# Patient Record
Sex: FEMALE | Race: WHITE | NOT HISPANIC OR LATINO | Employment: UNEMPLOYED | URBAN - METROPOLITAN AREA
[De-identification: names, ages, dates, MRNs, and addresses within clinical notes are randomized per-mention and may not be internally consistent; named-entity substitution may affect disease eponyms.]

---

## 2021-11-18 ENCOUNTER — TRANSCRIBE ORDERS (OUTPATIENT)
Dept: ADMINISTRATIVE | Facility: HOSPITAL | Age: 26
End: 2021-11-18

## 2021-11-18 DIAGNOSIS — Z11.59 ENCOUNTER FOR SCREENING FOR VIRAL DISEASE: Primary | ICD-10-CM

## 2021-11-30 ENCOUNTER — LAB (OUTPATIENT)
Dept: PREADMISSION TESTING | Facility: HOSPITAL | Age: 26
End: 2021-11-30

## 2021-11-30 PROCEDURE — U0004 COV-19 TEST NON-CDC HGH THRU: HCPCS | Performed by: SURGERY

## 2021-11-30 PROCEDURE — C9803 HOPD COVID-19 SPEC COLLECT: HCPCS | Performed by: SURGERY

## 2021-12-01 LAB — SARS-COV-2 RNA PNL SPEC NAA+PROBE: NOT DETECTED

## 2021-12-03 ENCOUNTER — LAB REQUISITION (OUTPATIENT)
Dept: LAB | Facility: HOSPITAL | Age: 26
End: 2021-12-03

## 2021-12-03 DIAGNOSIS — N63.25 UNSPECIFIED LUMP IN THE LEFT BREAST, OVERLAPPING QUADRANTS: ICD-10-CM

## 2021-12-03 PROCEDURE — 88305 TISSUE EXAM BY PATHOLOGIST: CPT | Performed by: SURGERY

## 2021-12-06 LAB
CYTO UR: NORMAL
LAB AP CASE REPORT: NORMAL
LAB AP CLINICAL INFORMATION: NORMAL
PATH REPORT.FINAL DX SPEC: NORMAL
PATH REPORT.GROSS SPEC: NORMAL

## 2022-01-04 ENCOUNTER — APPOINTMENT (OUTPATIENT)
Dept: PREADMISSION TESTING | Facility: HOSPITAL | Age: 27
End: 2022-01-04

## 2023-03-29 ENCOUNTER — OFFICE VISIT (OUTPATIENT)
Dept: CARDIOLOGY | Facility: CLINIC | Age: 28
End: 2023-03-29
Payer: COMMERCIAL

## 2023-03-29 VITALS
HEART RATE: 72 BPM | OXYGEN SATURATION: 98 % | SYSTOLIC BLOOD PRESSURE: 110 MMHG | HEIGHT: 65 IN | WEIGHT: 165.6 LBS | BODY MASS INDEX: 27.59 KG/M2 | DIASTOLIC BLOOD PRESSURE: 72 MMHG

## 2023-03-29 DIAGNOSIS — R07.2 PRECORDIAL PAIN: ICD-10-CM

## 2023-03-29 DIAGNOSIS — R00.2 PALPITATIONS: Primary | ICD-10-CM

## 2023-03-29 DIAGNOSIS — R42 LIGHTHEADEDNESS: ICD-10-CM

## 2023-03-29 DIAGNOSIS — R55 SYNCOPE AND COLLAPSE: ICD-10-CM

## 2023-03-29 DIAGNOSIS — R53.82 CHRONIC FATIGUE: ICD-10-CM

## 2023-03-29 PROBLEM — G43.909 MIGRAINES: Status: ACTIVE | Noted: 2023-03-29

## 2023-03-29 PROBLEM — M35.9 CONNECTIVE TISSUE DISORDER: Status: ACTIVE | Noted: 2023-03-29

## 2023-03-29 PROBLEM — F32.A DEPRESSION: Status: ACTIVE | Noted: 2023-03-29

## 2023-03-29 PROBLEM — M06.9 RHEUMATOID ARTHRITIS: Status: ACTIVE | Noted: 2023-03-29

## 2023-03-29 PROBLEM — K21.9 GERD (GASTROESOPHAGEAL REFLUX DISEASE): Status: ACTIVE | Noted: 2023-03-29

## 2023-03-29 PROBLEM — I10 ESSENTIAL HYPERTENSION: Status: ACTIVE | Noted: 2023-03-29

## 2023-03-29 RX ORDER — PROPRANOLOL HCL 60 MG
1 CAPSULE, EXTENDED RELEASE 24HR ORAL DAILY
COMMUNITY
Start: 2023-03-24

## 2023-03-29 RX ORDER — SUMATRIPTAN 50 MG/1
TABLET, FILM COATED ORAL
COMMUNITY
Start: 2023-03-24

## 2023-03-29 RX ORDER — FERROUS SULFATE 325(65) MG
TABLET ORAL
COMMUNITY
Start: 2023-03-24

## 2023-03-29 RX ORDER — FEXOFENADINE HCL 180 MG/1
1 TABLET ORAL DAILY
COMMUNITY
Start: 2023-03-07

## 2023-03-29 RX ORDER — IBUPROFEN 800 MG/1
800 TABLET ORAL EVERY 6 HOURS PRN
COMMUNITY

## 2023-03-29 RX ORDER — ESTRADIOL 0.1 MG/G
CREAM VAGINAL
COMMUNITY
Start: 2023-03-24

## 2023-03-29 RX ORDER — FAMOTIDINE 40 MG/1
1 TABLET, FILM COATED ORAL DAILY
COMMUNITY
Start: 2023-03-24

## 2023-03-29 RX ORDER — DROSPIRENONE AND ESTETROL 3-14.2(28)
1 KIT ORAL DAILY
COMMUNITY
Start: 2023-03-07

## 2023-03-29 RX ORDER — CARIPRAZINE 1.5 MG-3MG
1 KIT ORAL DAILY
COMMUNITY
Start: 2023-03-01

## 2023-03-29 RX ORDER — TRETINOIN 0.5 MG/G
1 CREAM TOPICAL NIGHTLY PRN
COMMUNITY
Start: 2023-03-08

## 2023-03-29 RX ORDER — LINACLOTIDE 145 UG/1
1 CAPSULE, GELATIN COATED ORAL DAILY
COMMUNITY
Start: 2023-03-24

## 2023-03-29 NOTE — PROGRESS NOTES
[facility] Cardiology  [address], [address]  [place], [place], [address] [phone]  WWW.[facility]           OUTPATIENT CLINIC CONSULTATION NOTE    Patient care team:  Patient Care Team:  Chente Major PA as PCP - General (Family Medicine)  Diego Judd MD as Consulting Physician (Cardiology)    Requesting Provider and Reason for consultation: The patient is being seen today at the request of Chente Major PA-C for palpitations.     Subjective:   Chief complaint:   Chief Complaint   Patient presents with   • Palpitations   • Chest Pain   • Shortness of Breath   • Dizziness       HPI:    Iram Godfrey is a 27 y.o. female.  Cardiac focused problem list:  1. Palpitations  2. Lightheadedness  3. Exercise intolerance  4. Hypertension  5. GERD  6. Rheumatoid arthritis  7. Chronic fatigue  8. Depression  9. Migraines    Patient presents today for consultation for palpitations.  Patient reports she has had palpitations for at least 10 years.  In the last year she has had worsening episodes of chest pain, shortness of breath, dizziness and fatigue.  This has been particularly worse in the last 5 to 6 months.  Symptoms associated with palpitations.  Episodes occurring 2-3 times a week.  Initially were more common after eating a large meal but since starting on propranolol 2 months ago they now seem more random.  Slight improvement in chest pain and headaches since starting propranolol.    Has family history of coronary disease in her mother who has had multiple MIs and is also a patient of Dr. Judd.  She notes history of heart disease on her father side of the family as well.  Patient denies tobacco, EtOH or drug use.  Has not been able to exercise recently due to increased fatigue and shortness of breath.  She has not had any prior cardiac testing.    Review of Systems:  As noted above in the HPI    PFSH:  Patient Active Problem List   Diagnosis   • Palpitations   •  Essential hypertension   • GERD (gastroesophageal reflux disease)   • Connective tissue disorder (HCC)   • Chronic fatigue   • Depression   • Migraines         Current Outpatient Medications:   •  Cariprazine HCl (Vraylar) 1.5 & 3 MG capsule therapy, Take 1 tablet by mouth Daily., Disp: , Rfl:   •  estradiol (ESTRACE) 0.1 MG/GM vaginal cream, INSERT 1 GRAM VAGINALLY AT BEDTIME TWICE WEEKLY & APPLY SPARINGLY EXTERNALLY AS DIRECTED, Disp: , Rfl:   •  famotidine (PEPCID) 40 MG tablet, Take 1 tablet by mouth Daily., Disp: , Rfl:   •  FeroSul 325 (65 Fe) MG tablet, TAKE ONE TABLET BY MOUTH DAILY WITH A MEAL, Disp: , Rfl:   •  fexofenadine (ALLEGRA) 180 MG tablet, Take 1 tablet by mouth Daily., Disp: , Rfl:   •  ibuprofen (ADVIL,MOTRIN) 800 MG tablet, Take 1 tablet by mouth Every 6 (Six) Hours As Needed for Mild Pain., Disp: , Rfl:   •  Linzess 145 MCG capsule capsule, Take 1 capsule by mouth Daily., Disp: , Rfl:   •  Nextstellis 3-14.2 MG tablet, Take 1 tablet by mouth Daily., Disp: , Rfl:   •  propranolol LA (INDERAL LA) 60 MG 24 hr capsule, Take 1 capsule by mouth Daily., Disp: , Rfl:   •  SUMAtriptan (IMITREX) 50 MG tablet, TAKE ONE TABLET BY MOUTH AT ONSET OF MIGRAINE, MAY REPEAT AFTER 2 HOURS IF HEADACHE RETURNS, MAX 4 TABLETS IN 24 HOURS, Disp: , Rfl:   •  tretinoin (RETIN-A) 0.05 % cream, Apply 1 application topically to the appropriate area as directed At Night As Needed., Disp: , Rfl:   •  vitamin D3 125 MCG (5000 UT) capsule capsule, Take 1 capsule by mouth Daily., Disp: , Rfl:     Allergies   Allergen Reactions   • Doxycycline GI Intolerance   • Trazodone Swelling     Face, eyes and tongue       Social History     Socioeconomic History   • Marital status:    Tobacco Use   • Smoking status: Never   • Smokeless tobacco: Never   Vaping Use   • Vaping Use: Never used   Substance and Sexual Activity   • Alcohol use: Never   • Drug use: Never   • Sexual activity: Defer     Family History   Problem Relation  "Age of Onset   • Heart attack Mother    • Diabetes Mother         Hx of diabetes   • Diabetes Father    • Hypertension Father    • Lupus Sister          Objective:   Physical Exam:  /72 (BP Location: Right arm, Patient Position: Sitting)   Pulse 72   Ht 165.1 cm (65\")   Wt 75.1 kg (165 lb 9.6 oz)   SpO2 98%   BMI 27.56 kg/m²   CONSTITUTIONAL: No acute distress  RESPIRATORY: Normal effort. Clear to auscultation bilaterally without wheezing or rales  CARDIOVASCULAR: Regular rate and rhythm with normal S1 and S2. Without murmur.  PERIPHERAL VASCULAR: No carotid bruit bilaterally.  Normal radial pulse. There is no lower extremity edema bilaterally.    3/2023 exam: Laying 112/70 HR 72, sitting 114/80 HR 72, standing 108/80 HR 72    Labs:  Labs reviewed by myself    No results found for: CHOL  No results found for: TRIG  No results found for: HDL  No results found for: LDL  No components found for: LDLDIRECTC    [facility] labs, 1/18/2023:  · Thyroid panel: TSH 4.11  · CBC: WBC 7.2, Hgb 13.5, HCT 40,       Diagnostic Data:      ECG 12 Lead    Date/Time: 3/29/2023 2:10 PM  Performed by: Diego Judd MD  Authorized by: Diego Judd MD   Comparison: not compared with previous ECG   Rhythm: sinus rhythm and sinus arrhythmia  Rate: normal  BPM: 66  Conduction: conduction normal                  Assessment and Plan:     Palpitations  Precordial pain  Lightheadedness   Chronic fatigue  Presyncope  -Longstanding history of palpitations for the last 10 years.  Now with multitude of symptoms including palpitations, chest pain, lightheadedness, fatigue, pre-syncope worsening over the last year.   -Possibly has autonomic dysfunction  -Orthostatic BP/HR measurements negative for orthostasis and POTS in office 3/2023  -Recommend heart monitor to evaluate for arrhythmias.  48 hour monitor due to insurance coverage.  14-day heart monitor if 48-hour monitor does not capture an episode of the patient's significant " palpitations  -Echocardiogram to rule out structural or functional abnormalities.  -Tilt table test to further evaluate autonomic dysfunction  -Continue propranolol.  It seems to have helped  -Encouraged increased exercise, recommended the Maurer/CHOPS protocol  -Encouraged hydration and liberalizing salt  -We will consider salt tablets, Florinef, etc  -Began discussion of being seen at an autonomic dysfunction center.    - Return in about 6 months (around 9/29/2023) for Next scheduled follow up.    Scribed for Diego Judd MD by Brianna Ya, APRN. 3/29/2023  14:10 EDT    Diego Judd MD, MSc, FACC, [facility]  Interventional Cardiology  [facility]

## 2023-04-03 ENCOUNTER — HOSPITAL ENCOUNTER (OUTPATIENT)
Dept: CARDIOLOGY | Facility: HOSPITAL | Age: 28
Discharge: HOME | End: 2023-04-03
Admitting: INTERNAL MEDICINE
Payer: COMMERCIAL

## 2023-04-03 VITALS
HEIGHT: 65 IN | BODY MASS INDEX: 27.49 KG/M2 | SYSTOLIC BLOOD PRESSURE: 119 MMHG | DIASTOLIC BLOOD PRESSURE: 73 MMHG | WEIGHT: 165 LBS

## 2023-04-03 DIAGNOSIS — R55 SYNCOPE AND COLLAPSE: ICD-10-CM

## 2023-04-03 LAB
BH CV ECHO MEAS - AO MAX PG: 6 MMHG
BH CV ECHO MEAS - AO MEAN PG: 3.7 MMHG
BH CV ECHO MEAS - AO ROOT DIAM: 2.7 CM
BH CV ECHO MEAS - AO V2 MAX: 122.1 CM/SEC
BH CV ECHO MEAS - AO V2 VTI: 27.5 CM
BH CV ECHO MEAS - AVA(I,D): 2.11 CM2
BH CV ECHO MEAS - EDV(CUBED): 69.9 ML
BH CV ECHO MEAS - EDV(MOD-SP2): 66 ML
BH CV ECHO MEAS - EDV(MOD-SP4): 73 ML
BH CV ECHO MEAS - EF(MOD-BP): 59 %
BH CV ECHO MEAS - EF(MOD-SP2): 60.6 %
BH CV ECHO MEAS - EF(MOD-SP4): 60.3 %
BH CV ECHO MEAS - ESV(CUBED): 17 ML
BH CV ECHO MEAS - ESV(MOD-SP2): 26 ML
BH CV ECHO MEAS - ESV(MOD-SP4): 29 ML
BH CV ECHO MEAS - FS: 37.6 %
BH CV ECHO MEAS - IVS/LVPW: 1.08 CM
BH CV ECHO MEAS - IVSD: 0.93 CM
BH CV ECHO MEAS - LA DIMENSION: 2.8 CM
BH CV ECHO MEAS - LV DIASTOLIC VOL/BSA (35-75): 40 CM2
BH CV ECHO MEAS - LV MASS(C)D: 114 GRAMS
BH CV ECHO MEAS - LV MAX PG: 3.3 MMHG
BH CV ECHO MEAS - LV MEAN PG: 1.91 MMHG
BH CV ECHO MEAS - LV SYSTOLIC VOL/BSA (12-30): 15.9 CM2
BH CV ECHO MEAS - LV V1 MAX: 91.3 CM/SEC
BH CV ECHO MEAS - LV V1 VTI: 19 CM
BH CV ECHO MEAS - LVIDD: 4.1 CM
BH CV ECHO MEAS - LVIDS: 2.6 CM
BH CV ECHO MEAS - LVOT AREA: 3.1 CM2
BH CV ECHO MEAS - LVOT DIAM: 1.97 CM
BH CV ECHO MEAS - LVPWD: 0.86 CM
BH CV ECHO MEAS - MR MAX PG: 35.7 MMHG
BH CV ECHO MEAS - MR MAX VEL: 291.5 CM/SEC
BH CV ECHO MEAS - MV A MAX VEL: 46.3 CM/SEC
BH CV ECHO MEAS - MV DEC SLOPE: 214 CM/SEC2
BH CV ECHO MEAS - MV DEC TIME: 0.18 MSEC
BH CV ECHO MEAS - MV E MAX VEL: 86 CM/SEC
BH CV ECHO MEAS - MV E/A: 1.86
BH CV ECHO MEAS - MV MAX PG: 3.9 MMHG
BH CV ECHO MEAS - MV MEAN PG: 1.29 MMHG
BH CV ECHO MEAS - MV P1/2T: 137.7 MSEC
BH CV ECHO MEAS - MV V2 VTI: 33.4 CM
BH CV ECHO MEAS - MVA(P1/2T): 1.6 CM2
BH CV ECHO MEAS - MVA(VTI): 1.74 CM2
BH CV ECHO MEAS - PA ACC SLOPE: 461 CM/SEC2
BH CV ECHO MEAS - PA ACC TIME: 0.16 SEC
BH CV ECHO MEAS - PA PR(ACCEL): 7.7 MMHG
BH CV ECHO MEAS - PA V2 MAX: 109.9 CM/SEC
BH CV ECHO MEAS - PI END-D VEL: 105.9 CM/SEC
BH CV ECHO MEAS - PULM A REVS VEL: 19.7 CM/SEC
BH CV ECHO MEAS - PULM DIAS VEL: 31.6 CM/SEC
BH CV ECHO MEAS - PULM S/D: 1.55
BH CV ECHO MEAS - PULM SYS VEL: 49 CM/SEC
BH CV ECHO MEAS - RAP SYSTOLE: 3 MMHG
BH CV ECHO MEAS - RVSP: 15 MMHG
BH CV ECHO MEAS - SI(MOD-SP2): 21.9 ML/M2
BH CV ECHO MEAS - SI(MOD-SP4): 24.1 ML/M2
BH CV ECHO MEAS - SV(LVOT): 58.1 ML
BH CV ECHO MEAS - SV(MOD-SP2): 40 ML
BH CV ECHO MEAS - SV(MOD-SP4): 44 ML
BH CV ECHO MEAS - TAPSE (>1.6): 2 CM
BH CV ECHO MEAS - TR MAX PG: 12 MMHG
BH CV ECHO MEAS - TR MAX VEL: 169.7 CM/SEC
BH CV XLRA - RV BASE: 3.7 CM
BH CV XLRA - RV LENGTH: 6.5 CM
BH CV XLRA - RV MID: 2.7 CM
LEFT ATRIUM VOLUME INDEX: 17 ML/M2
MAXIMAL PREDICTED HEART RATE: 191 BPM
STRESS TARGET HR: 162 BPM

## 2023-04-03 PROCEDURE — 93306 TTE W/DOPPLER COMPLETE: CPT | Performed by: INTERNAL MEDICINE

## 2023-04-03 PROCEDURE — 93306 TTE W/DOPPLER COMPLETE: CPT

## 2023-04-18 ENCOUNTER — TELEPHONE (OUTPATIENT)
Dept: CARDIOLOGY | Facility: CLINIC | Age: 28
End: 2023-04-18
Payer: COMMERCIAL

## 2023-04-18 DIAGNOSIS — R00.2 PALPITATIONS: Primary | ICD-10-CM

## 2023-04-18 NOTE — TELEPHONE ENCOUNTER
----- Message from Diego Judd MD sent at 4/17/2023  5:27 PM EDT -----  48-hour Holter did not show any arrhythmia.  Recommend 14-day monitor  ----- Message -----  From: Diego Judd MD  Sent: 4/17/2023   2:42 PM EDT  To: Diego Judd MD

## 2023-04-26 ENCOUNTER — OFFICE VISIT (OUTPATIENT)
Dept: CARDIOLOGY | Facility: CLINIC | Age: 28
End: 2023-04-26
Payer: COMMERCIAL

## 2023-04-26 VITALS
HEART RATE: 83 BPM | HEIGHT: 65 IN | WEIGHT: 174 LBS | DIASTOLIC BLOOD PRESSURE: 72 MMHG | OXYGEN SATURATION: 97 % | SYSTOLIC BLOOD PRESSURE: 122 MMHG | BODY MASS INDEX: 28.99 KG/M2

## 2023-04-26 DIAGNOSIS — G47.19 OTHER HYPERSOMNIA: Primary | ICD-10-CM

## 2023-04-26 RX ORDER — LAMOTRIGINE 100 MG/1
TABLET ORAL
COMMUNITY
Start: 2023-04-19

## 2023-04-26 RX ORDER — HYDROXYCHLOROQUINE SULFATE 200 MG/1
200 TABLET, FILM COATED ORAL 2 TIMES DAILY
COMMUNITY
Start: 2023-04-12

## 2023-04-26 RX ORDER — PRAZOSIN HYDROCHLORIDE 1 MG/1
1 CAPSULE ORAL
COMMUNITY
Start: 2023-04-19

## 2023-04-26 RX ORDER — TRIAMCINOLONE ACETONIDE 1 MG/G
CREAM TOPICAL
COMMUNITY
Start: 2023-03-08

## 2023-04-26 RX ORDER — FEXOFENADINE HCL 180 MG/1
1 TABLET ORAL DAILY
COMMUNITY

## 2023-04-26 RX ORDER — FAMOTIDINE 40 MG/1
40 TABLET, FILM COATED ORAL DAILY
COMMUNITY
Start: 2023-03-24

## 2023-04-26 RX ORDER — ESTRADIOL 0.1 MG/G
CREAM VAGINAL
COMMUNITY
Start: 2023-03-24

## 2023-04-26 RX ORDER — SUMATRIPTAN 50 MG/1
1 TABLET, FILM COATED ORAL AS NEEDED
COMMUNITY
Start: 2023-03-24

## 2023-04-26 RX ORDER — IBUPROFEN 800 MG/1
800 TABLET ORAL AS NEEDED
COMMUNITY
Start: 2023-04-11

## 2023-04-26 RX ORDER — CHOLECALCIFEROL (VITAMIN D3) 1250 MCG
1 CAPSULE ORAL DAILY
COMMUNITY
Start: 2023-02-01

## 2023-04-26 NOTE — PROGRESS NOTES
New Sleep Consult     Date:   2023  Name: rIam Godfrey  :   1995  PCP: Chente Major PA    Chief Complaint   Patient presents with   • [facility]       Subjective     History of Present Illness  Iram Godfrey is a 27 y.o. female who presents today for new patient evaluation for possible sleep apnea.  She has had a long-term issue with her sleep.  She has nightmares recently when she tries to fall asleep.  She also can only sleep a few hours the night before she wakes up.  Her nightmares have gotten worse recently since starting Lamictal.  She also has daytime sleepiness excessively.  She has had some sleepy driving but has never fallen asleep at the wheel.  She does snore mildly but does not wake up gasping.    No specialty comments available.    Further details are as follows:    Custer Scale is (out of 24): Total score: 20     Estimated average amount of sleep per night: 5  Number of times she wakes up at night: 2  Difficulty falling back asleep: yes  It usually takes 60 minutes to go to sleep.  She feels sleepy upon waking up: yes  Rotating or night shift work: no    Drowsiness/Sleepiness:  She exhibits the following:  excessive daytime sleepiness  excessive daytime fatigue  falls asleep watching TV  difficulty driving due to sleepiness  sleepy even while on vacation    Snoring/Breathing:  She exhibits the following:  loud snoring and awakens with nightmares    Head Injury:  She exhibits the following:  No    Reflux:  She describes the following:  none    Narcolepsy:  She exhibits the following:  feeling of paralysis while going to sleep or coming out of sleep  visual hallucinations while falling asleep    RLS/PLMs:  She describes the following:  moves or jerks during sleep  discomfort in legs with an urge to move them    Insomnia:  She describes the following:  problems initiating sleep at night  frequent awakenings  restless sleep    Parasomnia:  She exhibits the  following:  sleep talks  acts out dreams  wakes up screaming at night  frequent nightmares  grinds teeth    Weight:       04/26/23  1113   Weight: 78.9 kg (174 lb)      Weight change in the last year:  gain: 15 lbs    The patient's relevant past medical, surgical, family, and social history reviewed and updated in Epic as appropriate.    Past Medical History:   Diagnosis Date   • Hypertension    • IBS (irritable bowel syndrome)    • Migraines    • Mixed connective tissue disease      Past Surgical History:   Procedure Laterality Date   • BREAST BIOPSY Left     fibro adenoma   • ENDOMETRIAL BIOPSY      lapscopric   • TONSILLECTOMY     • WISDOM TOOTH EXTRACTION       OB History    No obstetric history on file.       Allergies   Allergen Reactions   • Doxycycline GI Intolerance   • Trazodone Swelling     Face, eyes and tongue     Prior to Admission medications    Medication Sig Start Date End Date Taking? Authorizing Provider   Cholecalciferol (Vitamin D3) 1.25 MG (16194 UT) capsule Take 1 capsule by mouth Daily. 2/1/23  Yes Marcella Riley MD   diclofenac (VOLTAREN) 50 MG EC tablet Take 1 tablet by mouth Daily As Needed. 4/21/23  Yes Marcella Riley MD   estradiol (ESTRACE) 0.1 MG/GM vaginal cream Insert  into the vagina. 3/24/23  Yes Marcella Riley MD   famotidine (PEPCID) 40 MG tablet Take 1 tablet by mouth Daily. 3/24/23  Yes Marcella Riley MD   FeroSul 325 (65 Fe) MG tablet TAKE ONE TABLET BY MOUTH DAILY WITH A MEAL 3/24/23  Yes Marcella Riley MD   fexofenadine (ALLEGRA) 180 MG tablet Take 1 tablet by mouth Daily.   Yes ProviderMarcella MD   hydroxychloroquine (PLAQUENIL) 200 MG tablet Take 1 tablet by mouth 2 (Two) Times a Day. 4/12/23  Yes Marcella Riley MD   ibuprofen (ADVIL,MOTRIN) 800 MG tablet Take 1 tablet by mouth As Needed. 4/11/23  Yes Marcella Riley MD   lamoTRIgine (LaMICtal) 100 MG tablet TAKE 12 TABLET BY MOUTH DAILY FOR 7 DAYS THEN INCREASE TO 1  DAILY 4/19/23  Yes Marcella Riley MD   Linzess 145 MCG capsule capsule Take 1 capsule by mouth Daily. 3/24/23  Yes Marcella Riley MD   Nextellis 3-14.2 MG tablet Take 1 tablet by mouth Daily. 3/7/23  Yes Marcella Riley MD   prazosin (MINIPRESS) 1 MG capsule Take 1 capsule by mouth Tonight. 4/19/23  Yes Marcella Riley MD   propranolol LA (INDERAL LA) 60 MG 24 hr capsule Take 1 capsule by mouth Daily. 3/24/23  Yes Marcella Riley MD   SUMAtriptan (IMITREX) 50 MG tablet Take 1 tablet by mouth As Needed. 3/24/23  Yes Marcella Riley MD   triamcinolone (KENALOG) 0.1 % cream  3/8/23  Yes Marcella Riley MD   Cariprazine HCl (Vraylar) 1.5 & 3 MG capsule therapy Take 1 tablet by mouth Daily. 3/1/23 4/26/23  Marcella Riley MD   estradiol (ESTRACE) 0.1 MG/GM vaginal cream INSERT 1 GRAM VAGINALLY AT BEDTIME TWICE WEEKLY & APPLY SPARINGLY EXTERNALLY AS DIRECTED 3/24/23 4/26/23  Marcella Riley MD   famotidine (PEPCID) 40 MG tablet Take 1 tablet by mouth Daily. 3/24/23 4/26/23  Marcella Riley MD   fexofenadine (ALLEGRA) 180 MG tablet Take 1 tablet by mouth Daily. 3/7/23 4/26/23  Marcella Riley MD   ibuprofen (ADVIL,MOTRIN) 800 MG tablet Take 1 tablet by mouth Every 6 (Six) Hours As Needed for Mild Pain.  4/26/23  Marcella Riley MD   SUMAtriptan (IMITREX) 50 MG tablet TAKE ONE TABLET BY MOUTH AT ONSET OF MIGRAINE, MAY REPEAT AFTER 2 HOURS IF HEADACHE RETURNS, MAX 4 TABLETS IN 24 HOURS 3/24/23 4/26/23  Marcella Riley MD   tretinoin (RETIN-A) 0.05 % cream Apply 1 application topically to the appropriate area as directed At Night As Needed. 3/8/23 4/26/23  Marcella Riley MD   vitamin D3 125 MCG (5000 UT) capsule capsule Take 1 capsule by mouth Daily.  4/26/23  Marcella Riley MD     Family History   Problem Relation Age of Onset   • Heart attack Mother    • Diabetes Mother         Hx of diabetes   • Diabetes Father    • Hypertension  "Father    • Lupus Sister        Objective     Vital Signs:  /72 (BP Location: Left arm, Patient Position: Sitting)   Pulse 83   Ht 165.1 cm (65\")   Wt 78.9 kg (174 lb)   SpO2 97%   BMI 28.96 kg/m²     BMI is >= 25 and <30. (Overweight) The following options were offered after discussion;: referral to a nutritionist and referral to primary care        Physical Exam  Vitals reviewed.   Constitutional:       General: She is not in acute distress.     Appearance: Normal appearance.   HENT:      Head: Normocephalic and atraumatic.      Mouth/Throat:      Mouth: Mucous membranes are moist.   Eyes:      Conjunctiva/sclera: Conjunctivae normal.   Neck:      Vascular: No carotid bruit.   Cardiovascular:      Rate and Rhythm: Normal rate and regular rhythm.      Pulses: Normal pulses.      Heart sounds: Normal heart sounds. No murmur heard.  Pulmonary:      Effort: Pulmonary effort is normal. No respiratory distress.      Breath sounds: Normal breath sounds. No wheezing or rhonchi.   Abdominal:      General: Abdomen is flat.      Palpations: Abdomen is soft.   Musculoskeletal:      Cervical back: Normal range of motion and neck supple.      Right lower leg: No edema.      Left lower leg: No edema.   Skin:     General: Skin is warm and dry.      Coloration: Skin is not jaundiced.   Neurological:      General: No focal deficit present.      Mental Status: She is alert and oriented to person, place, and time. Mental status is at baseline.      GCS: GCS eye subscore is 4. GCS verbal subscore is 5. GCS motor subscore is 6.      Cranial Nerves: No cranial nerve deficit.      Motor: No weakness.      Gait: Gait normal.   Psychiatric:         Mood and Affect: Mood and affect normal. Mood is not anxious.         Speech: Speech normal.         Behavior: Behavior normal.                   Assessment and Plan     Iram Godfrey is a 27 y.o. female who presents for further evaluation of excessive daytime sleepiness and " fatigue, nonrestorative sleep, and concerns for sleep disordered breathing and obstructive sleep apnea.  We will obtain a home sleep test for further evaluation.  The patient will return for follow-up and recommendations after test.  I have discussed weight loss as it pertains to obstructive sleep apnea.    Diagnoses and all orders for this visit:    1. Other hypersomnia (Primary)  -     Home Sleep Study; Future    I discussed that she may have more complex sleep disorders that may require referral for cognitive behavioral therapy.  Also discussed that we may need an MSLT in the future if narcolepsy is suspected.  May need in lab sleep study as well.  She is agreeable with all of these things.  We will start with home sleep study first and go from there.    I discussed the consequences of uncontrolled sleep apnea including hypertension, heart disease, diabetes, stroke, and dementia. I further discussed sleep apnea therapeutic options including CPAP, Weight loss, Oral dental appliance, and surgery.    ER if symptoms increase, Sleep hygiene discussed, Sleep risks reviewed (driving, medical, sleep death, sedating agents), Exercise recommendations discussed and Report if any new/changing symptoms immediately         Follow Up  Return in about 3 months (around 7/26/2023) for Recheck symptoms.  Patient was given instructions and counseling regarding her condition or for health maintenance advice. Please see specific information pulled into the AVS if appropriate.

## 2023-05-12 ENCOUNTER — HOSPITAL ENCOUNTER (OUTPATIENT)
Dept: CARDIOLOGY | Facility: HOSPITAL | Age: 28
Discharge: HOME | End: 2023-05-12
Admitting: INTERNAL MEDICINE
Payer: COMMERCIAL

## 2023-05-12 DIAGNOSIS — R55 SYNCOPE AND COLLAPSE: ICD-10-CM

## 2023-05-12 PROCEDURE — 93660 TILT TABLE EVALUATION: CPT

## 2023-05-12 PROCEDURE — 93660 TILT TABLE EVALUATION: CPT | Performed by: INTERNAL MEDICINE

## 2023-05-15 ENCOUNTER — TELEPHONE (OUTPATIENT)
Dept: CARDIOLOGY | Facility: CLINIC | Age: 28
End: 2023-05-15
Payer: COMMERCIAL

## 2023-05-15 LAB
MAXIMAL PREDICTED HEART RATE: 191 BPM
STRESS TARGET HR: 162 BPM

## 2023-05-15 NOTE — TELEPHONE ENCOUNTER
----- Message from Diego Judd MD sent at 5/15/2023 10:03 AM EDT -----  Please let patient know tilt table test and heart monitor both look fine.  Recommend aggressive exercise, fluid hydration and continue propranolol    ----- Message -----  From: Honorio Fleming MD  Sent: 5/15/2023   9:50 AM EDT  To: Diego Judd MD

## 2023-06-05 DIAGNOSIS — G47.19 OTHER HYPERSOMNIA: ICD-10-CM

## 2023-06-07 ENCOUNTER — OFFICE VISIT (OUTPATIENT)
Dept: CARDIOLOGY | Facility: CLINIC | Age: 28
End: 2023-06-07
Payer: COMMERCIAL

## 2023-06-07 VITALS
WEIGHT: 174 LBS | SYSTOLIC BLOOD PRESSURE: 116 MMHG | HEIGHT: 65 IN | DIASTOLIC BLOOD PRESSURE: 70 MMHG | OXYGEN SATURATION: 98 % | BODY MASS INDEX: 28.99 KG/M2 | HEART RATE: 78 BPM

## 2023-06-07 DIAGNOSIS — G47.10 HYPERSOMNIA: Primary | ICD-10-CM

## 2023-06-07 PROCEDURE — 99213 OFFICE O/P EST LOW 20 MIN: CPT | Performed by: NURSE PRACTITIONER

## 2023-06-07 PROCEDURE — 3074F SYST BP LT 130 MM HG: CPT | Performed by: NURSE PRACTITIONER

## 2023-06-07 PROCEDURE — 1159F MED LIST DOCD IN RCRD: CPT | Performed by: NURSE PRACTITIONER

## 2023-06-07 PROCEDURE — 1160F RVW MEDS BY RX/DR IN RCRD: CPT | Performed by: NURSE PRACTITIONER

## 2023-06-07 PROCEDURE — 3078F DIAST BP <80 MM HG: CPT | Performed by: NURSE PRACTITIONER

## 2023-06-07 RX ORDER — TRETINOIN 0.5 MG/G
CREAM TOPICAL
COMMUNITY
Start: 2023-05-30

## 2023-06-07 NOTE — PROGRESS NOTES
Cardiovascular and Sleep Consulting Provider Note     Date:   2023   Name: Iram Godfrey  :   1995  PCP: Chente Major PA    Chief Complaint   Patient presents with    hypersomnia       Subjective     History of Present Illness  Iram Godfrey is a 27 y.o. female who presents today for home sleep study results.  At last office visit on 2023, patient reported long-term issues with sleep.  She does not sleep well at night and experiences excessive daytime sleepiness.  She falls asleep several times a day.  She sometimes gets sleepy when she is driving.  She will sometimes fall asleep while riding on the back of her 's motorcycle.  Her primary care provider is concerned she may have narcolepsy.  Sleep study from 2023 revealed an overall AHI of 1.5, sleep apnea not seen.  I discussed further testing to rule out narcolepsy with patient.  She would like to proceed with MSLT.    No specialty comments available.    Allergies   Allergen Reactions    Doxycycline GI Intolerance    Trazodone Swelling     Face, eyes and tongue       Current Outpatient Medications:     Cholecalciferol (Vitamin D3) 1.25 MG (93890 UT) capsule, Take 1 capsule by mouth Daily., Disp: , Rfl:     diclofenac (VOLTAREN) 50 MG EC tablet, Take 1 tablet by mouth Daily As Needed., Disp: , Rfl:     estradiol (ESTRACE) 0.1 MG/GM vaginal cream, Insert  into the vagina., Disp: , Rfl:     famotidine (PEPCID) 40 MG tablet, Take 1 tablet by mouth Daily., Disp: , Rfl:     FeroSul 325 (65 Fe) MG tablet, TAKE ONE TABLET BY MOUTH DAILY WITH A MEAL, Disp: , Rfl:     fexofenadine (ALLEGRA) 180 MG tablet, Take 1 tablet by mouth Daily., Disp: , Rfl:     hydroxychloroquine (PLAQUENIL) 200 MG tablet, Take 1 tablet by mouth 2 (Two) Times a Day., Disp: , Rfl:     ibuprofen (ADVIL,MOTRIN) 800 MG tablet, Take 1 tablet by mouth As Needed., Disp: , Rfl:     lamoTRIgine (LaMICtal) 100 MG tablet, TAKE 12 TABLET BY MOUTH DAILY FOR 7  "DAYS THEN INCREASE TO 1 DAILY, Disp: , Rfl:     Linzess 145 MCG capsule capsule, Take 1 capsule by mouth Daily., Disp: , Rfl:     Nextstellis 3-14.2 MG tablet, Take 1 tablet by mouth Daily., Disp: , Rfl:     prazosin (MINIPRESS) 1 MG capsule, Take 1 capsule by mouth Tonight., Disp: , Rfl:     propranolol LA (INDERAL LA) 60 MG 24 hr capsule, Take 1 capsule by mouth Daily., Disp: , Rfl:     SUMAtriptan (IMITREX) 50 MG tablet, Take 1 tablet by mouth As Needed., Disp: , Rfl:     tretinoin (RETIN-A) 0.05 % cream, , Disp: , Rfl:     triamcinolone (KENALOG) 0.1 % cream, , Disp: , Rfl:     Past Medical History:   Diagnosis Date    Hypertension     IBS (irritable bowel syndrome)     Migraines     Mixed connective tissue disease       Past Surgical History:   Procedure Laterality Date    BREAST BIOPSY Left     fibro adenoma    ENDOMETRIAL BIOPSY      lapscopric    TONSILLECTOMY      WISDOM TOOTH EXTRACTION       Family History   Problem Relation Age of Onset    Heart attack Mother     Diabetes Mother         Hx of diabetes    Diabetes Father     Hypertension Father     Lupus Sister      Social History     Socioeconomic History    Marital status:    Tobacco Use    Smoking status: Never    Smokeless tobacco: Never   Vaping Use    Vaping Use: Never used   Substance and Sexual Activity    Alcohol use: Never    Drug use: Never    Sexual activity: Defer       Objective     Vital Signs:  /70   Pulse 78   Ht 165.1 cm (65\")   Wt 78.9 kg (174 lb)   SpO2 98%   BMI 28.96 kg/m²   Estimated body mass index is 28.96 kg/m² as calculated from the following:    Height as of this encounter: 165.1 cm (65\").    Weight as of this encounter: 78.9 kg (174 lb).       [unfilled]    Physical Exam  Vitals reviewed.   Constitutional:       Appearance: Normal appearance.   HENT:      Head: Normocephalic.   Cardiovascular:      Rate and Rhythm: Normal rate and regular rhythm.      Heart sounds: Normal heart sounds.   Pulmonary: "      Effort: Pulmonary effort is normal.      Breath sounds: Normal breath sounds.   Musculoskeletal:      Right lower leg: No edema.      Left lower leg: No edema.   Skin:     General: Skin is warm and dry.      Capillary Refill: Capillary refill takes less than 2 seconds.   Neurological:      General: No focal deficit present.      Mental Status: She is alert and oriented to person, place, and time.   Psychiatric:         Mood and Affect: Mood normal.         Behavior: Behavior normal.         Home sleep study reviewed          Assessment and Plan     Diagnoses and all orders for this visit:    1. Hypersomnia (Primary)  Assessment & Plan:  Home sleep study was negative for sleep apnea.  Patient continues to experience excessive daytime sleepiness and frequent napping.  He has trouble staying awake during the day.  We discussed further testing to rule out narcolepsy.  - PSG/MSLT for further evaluation.  Referral to  sleep medicine if narcolepsy is confirmed.    Orders:  -     Polysomnography 4 or more parameters; Future  -     Multiple sleep latency test without CPAP; Future        Recommendations: Sleep risks reviewed (driving, medical, sleep death, sedating agents), Sleep hygiene discussed, and do not drive until narcolepsy has been ruled out.          Follow Up  Return in about 5 weeks (around 7/12/2023) for MSLT results. .  Patient was given instructions and counseling regarding her condition or for health maintenance advice. Please see specific information pulled into the AVS if appropriate.

## 2023-06-14 ENCOUNTER — TELEPHONE (OUTPATIENT)
Dept: CARDIOLOGY | Facility: CLINIC | Age: 28
End: 2023-06-14

## 2023-06-14 NOTE — TELEPHONE ENCOUNTER
MARGARET SCHROEDER CALLED FROM [facility] AND SAID THE ORDERS FOR HER SLEEP NEED CORRECTED.  SHE IS OFF NEXT WEEK FOR SUGEY BUT IF WE CAN GET THE ORDERS CORRECTED, SHE WILL SCHEDULE HER THE FOLLOWING WEEK FOR IT.

## 2023-08-16 DIAGNOSIS — G47.10 HYPERSOMNIA: ICD-10-CM

## 2023-08-30 ENCOUNTER — OFFICE VISIT (OUTPATIENT)
Dept: CARDIOLOGY | Facility: CLINIC | Age: 28
End: 2023-08-30
Payer: COMMERCIAL

## 2023-08-30 VITALS
HEIGHT: 65 IN | DIASTOLIC BLOOD PRESSURE: 78 MMHG | HEART RATE: 81 BPM | OXYGEN SATURATION: 99 % | BODY MASS INDEX: 29.66 KG/M2 | SYSTOLIC BLOOD PRESSURE: 100 MMHG | WEIGHT: 178 LBS

## 2023-08-30 DIAGNOSIS — G47.10 HYPERSOMNIA: Primary | ICD-10-CM

## 2023-08-30 PROCEDURE — 3074F SYST BP LT 130 MM HG: CPT | Performed by: NURSE PRACTITIONER

## 2023-08-30 PROCEDURE — 1160F RVW MEDS BY RX/DR IN RCRD: CPT | Performed by: NURSE PRACTITIONER

## 2023-08-30 PROCEDURE — 1159F MED LIST DOCD IN RCRD: CPT | Performed by: NURSE PRACTITIONER

## 2023-08-30 PROCEDURE — 99213 OFFICE O/P EST LOW 20 MIN: CPT | Performed by: NURSE PRACTITIONER

## 2023-08-30 PROCEDURE — 3078F DIAST BP <80 MM HG: CPT | Performed by: NURSE PRACTITIONER

## 2023-08-30 RX ORDER — LAMOTRIGINE 200 MG/1
TABLET ORAL
COMMUNITY
Start: 2023-08-25

## 2023-08-30 RX ORDER — PRAZOSIN HYDROCHLORIDE 2 MG/1
2 CAPSULE ORAL DAILY
COMMUNITY
Start: 2023-06-15

## 2023-08-30 NOTE — PATIENT INSTRUCTIONS
Quality Sleep Information, Adult  Quality sleep is important for your mental and physical health. It also improves your quality of life. Quality sleep means you:  Are asleep for most of the time you are in bed.  Fall asleep within 30 minutes.  Wake up no more than once a night.   Are awake for no longer than 20 minutes if you do wake up during the night.  Most adults need 7-8 hours of quality sleep each night.  How can poor sleep affect me?  If you do not get enough quality sleep, you may have:  Mood swings.  Daytime sleepiness.  Confusion.  Decreased reaction time.  Sleep disorders, such as insomnia and sleep apnea.  Difficulty with:  Solving problems.  Coping with stress.  Paying attention.  These issues may affect your performance and productivity at work, school, and at home. Lack of sleep may also put you at higher risk for accidents, suicide, and risky behaviors.  If you do not get quality sleep you may also be at higher risk for several health problems, including:  Infections.  Type 2 diabetes.  Heart disease.  High blood pressure.  Obesity.  Worsening of long-term conditions, like arthritis, kidney disease, depression, Parkinson's disease, and epilepsy.  What actions can I take to get more quality sleep?  A sign showing that a person should not smoke.         A sign showing that a person should not drink alcohol.      Stick to a sleep schedule. Go to sleep and wake up at about the same time each day. Do not try to sleep less on weekdays and make up for lost sleep on weekends. This does not work.  Try to get about 30 minutes of exercise on most days. Do not exercise 2-3 hours before going to bed.  Limit naps during the day to 30 minutes or less.  Do not use any products that contain nicotine or tobacco, such as cigarettes or e-cigarettes. If you need help quitting, ask your health care provider.  Do not drink caffeinated beverages for at least 8 hours before going to bed. Coffee, tea, and some sodas contain  caffeine.  Do not drink alcohol close to bedtime.  Do not eat large meals close to bedtime.  Do not take naps in the late afternoon.  Try to get at least 30 minutes of sunlight every day. Morning sunlight is best.  Make time to relax before bed. Reading, listening to music, or taking a hot bath promotes quality sleep.  Make your bedroom a place that promotes quality sleep. Keep your bedroom dark, quiet, and at a comfortable room temperature. Make sure your bed is comfortable. Take out sleep distractions like TV, a computer, smartphone, and bright lights.  If you are lying awake in bed for longer than 20 minutes, get up and do a relaxing activity until you feel sleepy.  Work with your health care provider to treat medical conditions that may affect sleeping, such as:  Nasal obstruction.  Snoring.  Sleep apnea and other sleep disorders.  Talk to your health care provider if you think any of your prescription medicines may cause you to have difficulty falling or staying asleep.  If you have sleep problems, talk with a sleep consultant. If you think you have a sleep disorder, talk with your health care provider about getting evaluated by a specialist.  Where to find more information  National Sleep Foundation website: https://sleepfoundation.org  National Heart, [employer], [employer] [place] (NHLBI): www.nhlbi.nih.gov/files/docs/public/sleep/healthy_sleep.pdf  Centers for Disease Control and Prevention (CDC): www.cdc.gov/sleep/index.html  Contact a health care provider if you:  Have trouble getting to sleep or staying asleep.  Often wake up very early in the morning and cannot get back to sleep.  Have daytime sleepiness.  Have daytime sleep attacks of suddenly falling asleep and sudden muscle weakness (narcolepsy).  Have a tingling sensation in your legs with a strong urge to move your legs (restless legs syndrome).  Stop breathing briefly during sleep (sleep apnea).  Think you have a sleep disorder or are taking a medicine  that is affecting your quality of sleep.  Summary  Most adults need 7-8 hours of quality sleep each night.  Getting enough quality sleep is an important part of health and well-being.  Make your bedroom a place that promotes quality sleep and avoid things that may cause you to have poor sleep, such as alcohol, caffeine, smoking, and large meals.  Talk to your health care provider if you have trouble falling asleep or staying asleep.  This information is not intended to replace advice given to you by your health care provider. Make sure you discuss any questions you have with your health care provider.  Document Revised: 10/17/2022 Document Reviewed: 10/17/2022  Elsevier Patient Education c 2022 [employer]

## 2023-08-30 NOTE — PROGRESS NOTES
[facility] Provider Note     Date:   2023   Name: Iram Godfrey  :   1995  PCP: Chnete Major PA    Chief Complaint   Patient presents with    Follow-up       Subjective     History of Present Illness  Iram Godfrey is a 27 y.o. female who presents today for follow-up on PSG and MSLT.  At previous office visits, patient reported long-term issues with sleep.  She does not sleep well at night and experiences excessive daytime sleepiness.  She falls asleep several times a day and sometimes gets sleepy when she is driving.  She completed a home sleep study on 2023 that revealed an overall AHI of 1.5, sleep apnea was not present.  She completed a PSG/MSLT on 8/15/23, no significant obstructive sleep apnea was noted on PSG.  MSLT did not show evidence of narcolepsy.  Pathological sleepiness was not seen, no sleep onset REM present.  Patient continues to experience excessive daytime sleepiness and fatigue.  She has an autoimmune connective tissue disorder and feels like a lot of her fatigue is related to that.  She also has trouble going to sleep at night.  She has tried several different sleep aids, but they all make her feel groggy the next day.    No specialty comments available.      Allergies   Allergen Reactions    Doxycycline GI Intolerance    Trazodone Swelling     Face, eyes and tongue       Current Outpatient Medications:     Cholecalciferol (Vitamin D3) 1.25 MG (10144 UT) capsule, Take 1 capsule by mouth Daily., Disp: , Rfl:     diclofenac (VOLTAREN) 50 MG EC tablet, Take 1 tablet by mouth Daily As Needed., Disp: , Rfl:     estradiol (ESTRACE) 0.1 MG/GM vaginal cream, Insert  into the vagina., Disp: , Rfl:     famotidine (PEPCID) 40 MG tablet, Take 1 tablet by mouth Daily., Disp: , Rfl:     FeroSul 325 (65 Fe) MG tablet, TAKE ONE TABLET BY MOUTH DAILY WITH A MEAL, Disp: , Rfl:     fexofenadine (ALLEGRA) 180 MG tablet, Take 1 tablet by mouth  "Daily., Disp: , Rfl:     hydroxychloroquine (PLAQUENIL) 200 MG tablet, Take 1 tablet by mouth 2 (Two) Times a Day., Disp: , Rfl:     ibuprofen (ADVIL,MOTRIN) 800 MG tablet, Take 1 tablet by mouth As Needed., Disp: , Rfl:     lamoTRIgine (LaMICtal) 200 MG tablet, , Disp: , Rfl:     Linzess 145 MCG capsule capsule, Take 1 capsule by mouth Daily., Disp: , Rfl:     Nextstellis 3-14.2 MG tablet, Take 1 tablet by mouth Daily., Disp: , Rfl:     prazosin (MINIPRESS) 2 MG capsule, Take 1 capsule by mouth Daily., Disp: , Rfl:     propranolol LA (INDERAL LA) 60 MG 24 hr capsule, Take 1 capsule by mouth Daily., Disp: , Rfl:     SUMAtriptan (IMITREX) 50 MG tablet, Take 1 tablet by mouth As Needed., Disp: , Rfl:     tretinoin (RETIN-A) 0.05 % cream, , Disp: , Rfl:     triamcinolone (KENALOG) 0.1 % cream, , Disp: , Rfl:     Past Medical History:   Diagnosis Date    Hypertension     IBS (irritable bowel syndrome)     Migraines     Mixed connective tissue disease       Past Surgical History:   Procedure Laterality Date    BREAST BIOPSY Left     fibro adenoma    ENDOMETRIAL BIOPSY      lapscopric    TONSILLECTOMY      WISDOM TOOTH EXTRACTION       Family History   Problem Relation Age of Onset    Heart attack Mother     Diabetes Mother         Hx of diabetes    Diabetes Father     Hypertension Father     Lupus Sister      Social History     Socioeconomic History    Marital status:    Tobacco Use    Smoking status: Never    Smokeless tobacco: Never   Vaping Use    Vaping Use: Never used   Substance and Sexual Activity    Alcohol use: Never    Drug use: Never    Sexual activity: Defer       Objective     Vital Signs:  /78   Pulse 81   Ht 165.1 cm (65\")   Wt 80.7 kg (178 lb)   SpO2 99%   BMI 29.62 kg/mý   Estimated body mass index is 29.62 kg/mý as calculated from the following:    Height as of this encounter: 165.1 cm (65\").    Weight as of this encounter: 80.7 kg (178 lb).               Physical Exam  Vitals " reviewed.   Constitutional:       Appearance: Normal appearance.   HENT:      Head: Normocephalic.   Cardiovascular:      Rate and Rhythm: Normal rate and regular rhythm.      Heart sounds: Normal heart sounds.   Pulmonary:      Effort: Pulmonary effort is normal.      Breath sounds: Normal breath sounds.   Musculoskeletal:      Right lower leg: No edema.      Left lower leg: No edema.   Skin:     General: Skin is warm and dry.      Capillary Refill: Capillary refill takes less than 2 seconds.   Neurological:      General: No focal deficit present.      Mental Status: She is alert and oriented to person, place, and time.   Psychiatric:         Mood and Affect: Mood normal.         Behavior: Behavior normal.         PSG and MSLT results reviewed          Assessment and Plan     Diagnoses and all orders for this visit:    1. Hypersomnia (Primary)  Assessment & Plan:  Patient continues to experience excessive daytime sleepiness and frequent napping.  She has trouble staying awake during the day.  She completed an PSG/MSLT on 8/16/2023, no sleep breathing disorder noted.  Pathologic sleepiness was not seen, no sleep onset REM was present.  Discussed results with patient and reviewed sleep hygiene measures.  Excessive fatigue is likely due to her underlying autoimmune disease.  She has previously discussed the possibility of taking a stimulant medication with her psychologist.  I recommend that she follow back up with him to discuss hat option          Recommendations: Report if any new/changing symptoms immediately, Sleep risks reviewed (driving, medical, sleep death, sedating agents), and Sleep hygiene discussed          Follow Up  Return if symptoms worsen or fail to improve.  Patient was given instructions and counseling regarding her condition or for health maintenance advice. Please see specific information pulled into the AVS if appropriate.

## 2023-08-30 NOTE — ASSESSMENT & PLAN NOTE
Patient continues to experience excessive daytime sleepiness and frequent napping.  She has trouble staying awake during the day.  She completed an PSG/MSLT on 8/16/2023, no sleep breathing disorder noted.  Pathologic sleepiness was not seen, no sleep onset REM was present.  Discussed results with patient and reviewed sleep hygiene measures.  Excessive fatigue is likely due to her underlying autoimmune disease.  She has previously discussed the possibility of taking a stimulant medication with her psychologist.  I recommend that she follow back up with him to discuss hat option